# Patient Record
Sex: FEMALE | URBAN - METROPOLITAN AREA
[De-identification: names, ages, dates, MRNs, and addresses within clinical notes are randomized per-mention and may not be internally consistent; named-entity substitution may affect disease eponyms.]

---

## 2017-06-03 ENCOUNTER — TRANSFERRED RECORDS (OUTPATIENT)
Dept: HEALTH INFORMATION MANAGEMENT | Facility: CLINIC | Age: 27
End: 2017-06-03

## 2017-10-07 ENCOUNTER — TELEPHONE (OUTPATIENT)
Dept: INTERNAL MEDICINE | Facility: CLINIC | Age: 27
End: 2017-10-07

## 2017-12-13 ENCOUNTER — TELEPHONE (OUTPATIENT)
Dept: FAMILY MEDICINE | Facility: CLINIC | Age: 27
End: 2017-12-13

## 2017-12-13 NOTE — LETTER
December 13, 2017    Miguelina Duque  [address]   [facility] [address]    Dear Geo Mcintyre cares about your health and your health plan.  I have reviewed your medical conditions, medication list and lab results, and am making recommendations based on this review to better manage your health.    You are in particular need of attention regarding:  -Cervical Cancer Screening  -Wellness (Physical) Visit     I am recommending that you:     -schedule a WELLNESS (Physical) APPOINTMENT with me.   I will check fasting labs the same day - nothing to eat except water and meds for 8-10 hours prior. Fill out PHQ-9 and send back in the envelope provided.      -schedule a PAP SMEAR EXAM which is due.  Please disregard this reminder if you have had this exam elsewhere within the last year.  It would be helpful for us to have a copy of your recent pap smear report in our file so that we can best coordinate your care.    If you are under/uninsured, we recommend you contact the [facility]. They offer pap smears at no charge or on a sliding fee charge. You can schedule with them at [phone]. Please have them send us the results.      Please call us at the [facility] location:  [phone] or use [email or web address] to address the above recommendations.     Thank you for trusting [facility].  We appreciate the opportunity to serve you and look forward to supporting your healthcare in the future.    If you have (or plan to have) any of these tests done at a facility other than a [facility] or a [facility], please have the results sent to the [facility] location noted above.      Best Regards,    Selene Solitario MD

## 2017-12-13 NOTE — TELEPHONE ENCOUNTER
Panel Management Review      Patient has the following on her problem list: None      Composite cancer screening  Chart review shows that this patient is due/due soon for the following Pap Smear  Summary:    Patient is due/failing the following:   PAP and PHYSICAL    Action needed:   Patient needs office visit for Pap.    Type of outreach:    Sent letter.    Questions for provider review:    None                                                                                                                                    Danae Antonio CMA       Chart routed to  .

## 2018-01-07 ENCOUNTER — HEALTH MAINTENANCE LETTER (OUTPATIENT)
Age: 28
End: 2018-01-07

## 2018-05-21 ENCOUNTER — HOSPITAL ENCOUNTER (EMERGENCY)
Facility: CLINIC | Age: 28
Discharge: HOME | End: 2018-05-21
Attending: EMERGENCY MEDICINE | Admitting: EMERGENCY MEDICINE
Payer: COMMERCIAL

## 2018-05-21 VITALS
WEIGHT: 106 LBS | RESPIRATION RATE: 20 BRPM | OXYGEN SATURATION: 100 % | TEMPERATURE: 98 F | HEART RATE: 65 BPM | SYSTOLIC BLOOD PRESSURE: 103 MMHG | HEIGHT: 60 IN | BODY MASS INDEX: 20.81 KG/M2 | DIASTOLIC BLOOD PRESSURE: 72 MMHG

## 2018-05-21 DIAGNOSIS — S29.011A MUSCLE STRAIN OF CHEST WALL, INITIAL ENCOUNTER: ICD-10-CM

## 2018-05-21 DIAGNOSIS — R55 VASOVAGAL SYNCOPE: ICD-10-CM

## 2018-05-21 LAB — INTERPRETATION ECG - MUSE: NORMAL

## 2018-05-21 PROCEDURE — 25000132 ZZH RX MED GY IP 250 OP 250 PS 637: Performed by: EMERGENCY MEDICINE

## 2018-05-21 PROCEDURE — 99283 EMERGENCY DEPT VISIT LOW MDM: CPT

## 2018-05-21 RX ORDER — ACETAMINOPHEN 500 MG
1000 TABLET ORAL ONCE
Status: COMPLETED | OUTPATIENT
Start: 2018-05-21 | End: 2018-05-21

## 2018-05-21 RX ADMIN — ACETAMINOPHEN 1000 MG: 500 TABLET, FILM COATED ORAL at 09:31

## 2018-05-21 ASSESSMENT — ENCOUNTER SYMPTOMS
MUSCULOSKELETAL NEGATIVE: 1
DIAPHORESIS: 1

## 2018-05-21 NOTE — ED AVS SNAPSHOT
Emergency Department    [address] [place] [address]    Phone:  [phone]    Fax:  [phone]                                       Miguelina Duque   MRN: [id number]    Department:   Emergency Department   Date of Visit:  5/21/2018           After Visit Summary Signature Page     I have received my discharge instructions, and my questions have been answered. I have discussed any challenges I see with this plan with the nurse or doctor.    ..........................................................................................................................................  Patient/Patient Representative Signature      ..........................................................................................................................................  Patient Representative Print Name and Relationship to Patient    ..................................................               ................................................  Date                                            Time    ..........................................................................................................................................  Reviewed by Signature/Title    ...................................................              ..............................................  Date                                                            Time

## 2018-05-21 NOTE — ED PROVIDER NOTES
History     Chief Complaint:  Loss of Consciousness    HPI   Miguelina Duque is a 28 year old female who presents to the emergency department today for evaluation of loss of consciousness. Yesterday the patient was doing heavy lifting while moving boxes and furniture. This morning the patient awoke with left sided chest pain. She went to the bathroom and sat down before noticing that she felt faint and sweaty. She then left the bathroom to get some air, but passed out. She woke up on the floor seconds later, and denies any injury sustained. After laying down for 5-10 minutes her chest pain had subsided. Her last menstrual period was 1 month ago and is not sexually active.    Cardiac/PE/DVT Risk Factors:  History of hypertension - Negative  History of hyperlipidemia - Negative  History of diabetes - Negative  History of smoking - Negative  Personal history of PE/DVT - Negative  Recent travel - Negative  Recent surgery - Negative  Other immobilizations - Negative  Cancer - Negative    Allergies:  No known drug allergies    Medications:    FLUCONAZOLE PO  omeprazole (PRILOSEC) 20 MG capsule  sulfamethoxazole-trimethoprim (BACTRIM DS,SEPTRA DS) 800-160 MG per tablet    Past Medical History:    History reviewed. No pertinent past medical history.    Past Surgical History:    History reviewed. No pertinent surgical history.    Family History:    History reviewed. No pertinent family history.    Social History:  The patient was accompanied to the ED by herself.  Smoking Status: Never Smoker  Smokeless Tobacco: Never Used  Alcohol Use: Negative   Marital Status:    Occupation: IT     Review of Systems   Constitutional: Positive for diaphoresis.   Cardiovascular: Positive for chest pain.   Musculoskeletal: Negative.    Neurological: Positive for syncope.   All other systems reviewed and are negative.    Physical Exam     Patient Vitals for the past 24 hrs:   BP Temp Temp src Pulse Resp SpO2 Height Weight   05/21/18  0912 103/72 98  F (36.7  C) Oral 65 20 100 % 1.524 m (5') 48.1 kg (106 lb)      Physical Exam  Constitutional:  Oriented to person, place, and time.      Appears well-developed and well-nourished.   HENT:   Head:    Normocephalic and atraumatic.   Right Ear:   Tympanic membrane and external ear normal.   Left Ear:   Tympanic membrane and external ear normal.   Mouth/Throat:   Oropharynx is clear and moist.      Mucous membranes are normal.   Eyes:    Conjunctivae normal and EOM are normal.      Pupils are equal, round, and reactive to light.   Neck:    Normal range of motion. Neck supple.   Cardiovascular:  Normal rate, regular rhythm, S1 normal and S2 normal.      No gallop and no friction rub. No murmur heard.  Pulmonary/Chest:  Breath sounds normal. No respiratory distress.      No wheezes. No rhonchi. No rales.      Left parasternal tenderness.   Abdominal:   Soft. No hepatosplenomegaly. No tenderness.      No rebound and no CVA tenderness.   Musculoskeletal:  Normal range of motion.   Neurological:   Alert and oriented to person, place, and time. Normal strength.      GCS eye subscore is 4. GCS verbal subscore is 5.      GCS motor subscore is 6.   Skin:    Skin is warm and dry.   Psychiatric:   Normal mood and affect.      Speech is normal and behavior is normal.      Judgment and thought content normal.      Cognition and memory are normal.     Emergency Department Course     Emergency Department Course:    0912 Nursing notes and vitals reviewed.    0920 I performed an exam of the patient as documented above.     0926 I personally reviewed the results with the patient and answered all related questions prior to discharge.    Impression & Plan      Medical Decision Making:  Miguelina Duque is a 28 year old female who presents to the emergency department today for evaluation of syncope.  She awoke this morning had some left-sided chest pain which she rates as 8 out of 10.  She then had typical symptoms of feeling  faint, sweaty, and hot. She got up to walk and then found herself on the floor. She denies any trauma from the fall. She has left-sided parasternal tenderness. She does note that she moved house yesterday as well as put a bed together so there was some physical strain to the chest.  She has no risk factors for cardiac disease or DVT/PE.  I believe she can be discharged home for expectant observation.  I believe this is vasovagal syncope and chest wall strain.  She was advised what to do in the future she should feel faint.  She can use Tylenol or ibuprofen as needed.    Diagnosis:    ICD-10-CM    1. Vasovagal syncope R55    2. Muscle strain of chest wall, initial encounter S29.011A      Disposition:   Discharge    Scribe Disclosure:  BELLO, Christiano Zhang, am serving as a scribe at 9:22 AM on 5/21/2018 to document services personally performed by Salome Patel MD based on my observations and the provider's statements to me.      EMERGENCY DEPARTMENT       Salome Patel MD  05/21/18 1929

## 2018-05-21 NOTE — ED AVS SNAPSHOT
Emergency Department    [address] [facility] [address]    Phone:  [phone]    Fax:  [phone]                                       Miguelina Duque   MRN: [id number]    Department:   Emergency Department   Date of Visit:  5/21/2018           Patient Information     Date Of Birth          1990        Your diagnoses for this visit were:     Vasovagal syncope     Muscle strain of chest wall, initial encounter        You were seen by Salome Patel MD.      Follow-up Information     Follow up with Selene Solitario MD.    Specialty:  Family Practice    Why:  As needed    Contact information:    [address] [place] [id number] [phone]          Discharge Instructions       Tylenol or Advil as needed.       Discharge References/Attachments     SYNCOPE, VASOVAGAL (ENGLISH)      Your next 10 appointments already scheduled     May 24, 2018  3:40 PM CDT   [facility] Visit with Selene [facility]ario MD   [facility]    [address] [id number] [phone]           Bring a current list of meds and any records pertaining to this visit. For Physicals, please bring immunization records and any forms needing to be filled out. Please arrive 10 minutes early to complete paperwork.              24 Hour Appointment Hotline       To make an appointment at any [facility], call [phone] ([phone]). If you don't have a family doctor or clinic, we will help you find one. [facility] are conveniently located to serve the needs of you and your family.             Review of your medicines      Our records show that you are taking the medicines listed below. If these are incorrect, please call your family doctor or clinic.        Dose / Directions Last dose taken    FLUCONAZOLE PO   Dose:  50 mg        Take 50 mg by mouth At Bedtime   Refills:  0         omeprazole 20 MG CR capsule   Commonly known as:  priLOSEC   Quantity:  60 capsule        TAKE 1 CAPSULE BY MOUTH 2 TIMES DAILY   Refills:  0        sulfamethoxazole-trimethoprim 800-160 MG per tablet   Commonly known as:  BACTRIM DS/SEPTRA DS   Dose:  1 tablet        Take 1 tablet by mouth 2 times daily   Refills:  0                Procedures and tests performed during your visit     EKG 12 lead      Orders Needing Specimen Collection     None      Pending Results     Date and Time Order Name Status Description    5/21/2018 0902 EKG 12 lead Preliminary             Pending Culture Results     No orders found from 5/19/2018 to 5/22/2018.            Pending Results Instructions     If you had any lab results that were not finalized at the time of your Discharge, you can call the ED Lab Result RN at [phone]. You will be contacted by this team for any positive Lab results or changes in treatment. The nurses are available 7 days a week from 10A to 6:30P.  You can leave a message 24 hours per day and they will return your call.        Test Results From Your Hospital Stay               Clinical Quality Measure: Blood Pressure Screening     Your blood pressure was checked while you were in the emergency department today. The last reading we obtained was  BP: 103/72 . Please read the guidelines below about what these numbers mean and what you should do about them.  If your systolic blood pressure (the top number) is less than 120 and your diastolic blood pressure (the bottom number) is less than 80, then your blood pressure is normal. There is nothing more that you need to do about it.  If your systolic blood pressure (the top number) is 120-139 or your diastolic blood pressure (the bottom number) is 80-89, your blood pressure may be higher than it should be. You should have your blood pressure rechecked within a year by a primary care provider.  If your systolic blood pressure (the top number) is 140 or greater  "or your diastolic blood pressure (the bottom number) is 90 or greater, you may have high blood pressure. High blood pressure is treatable, but if left untreated over time it can put you at risk for heart attack, stroke, or kidney failure. You should have your blood pressure rechecked by a primary care provider within the next 4 weeks.  If your provider in the emergency department today gave you specific instructions to follow-up with your doctor or provider even sooner than that, you should follow that instruction and not wait for up to 4 weeks for your follow-up visit.        Thank you for choosing [facility]       Thank you for choosing [facility] for your care. Our goal is always to provide you with excellent care. Hearing back from our patients is one way we can continue to improve our services. Please take a few minutes to complete the written survey that you may receive in the mail after you visit with us. Thank you!        [employer] Information     [employer] lets you send messages to your doctor, view your test results, renew your prescriptions, schedule appointments and more. To sign up, go to [email or web address] . Click on \"Log in\" on the left side of the screen, which will take you to the Welcome page. Then click on \"Sign up Now\" on the right side of the page.     You will be asked to enter the access code listed below, as well as some personal information. Please follow the directions to create your username and password.     Your access code is: [email or web address]  Expires: 2018  9:19 AM     Your access code will  in 90 days. If you need help or a new code, please call your [facility] clinic or [phone].        Care EveryWhere ID     This is your Care EveryWhere ID. This could be used by other organizations to access your [facility] medical records  [id number]        Equal Access to Services     NGUYEN HUYNH AH: desiree [place] qaybta kaalmada adeegyada, waxay idiin " pravin baltazar ah. So [facility] [phone].    ATENCIÓN: Si habla español, tiene a gray disposición servicios gratuitos de asistencia lingüística. Llame al [phone].    We comply with applicable federal civil rights laws and [place] laws. We do not discriminate on the basis of race, color, national origin, age, disability, sex, sexual orientation, or gender identity.            After Visit Summary       This is your record. Keep this with you and show to your community pharmacist(s) and doctor(s) at your next visit.